# Patient Record
Sex: FEMALE | Race: WHITE | Employment: PART TIME
[De-identification: names, ages, dates, MRNs, and addresses within clinical notes are randomized per-mention and may not be internally consistent; named-entity substitution may affect disease eponyms.]

---
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
## 2019-01-08 ENCOUNTER — RECORDS - HEALTHEAST (OUTPATIENT)
Dept: ADMINISTRATIVE | Facility: OTHER | Age: 19
End: 2019-01-08

## 2019-04-08 ENCOUNTER — OFFICE VISIT (OUTPATIENT)
Dept: FAMILY MEDICINE | Facility: CLINIC | Age: 19
End: 2019-04-08
Payer: COMMERCIAL

## 2019-04-08 VITALS
DIASTOLIC BLOOD PRESSURE: 74 MMHG | OXYGEN SATURATION: 100 % | RESPIRATION RATE: 16 BRPM | SYSTOLIC BLOOD PRESSURE: 135 MMHG | WEIGHT: 141 LBS | HEART RATE: 83 BPM | TEMPERATURE: 98 F | HEIGHT: 65 IN | BODY MASS INDEX: 23.49 KG/M2

## 2019-04-08 DIAGNOSIS — F60.3 BORDERLINE PERSONALITY DISORDER (H): ICD-10-CM

## 2019-04-08 DIAGNOSIS — Z32.00 ENCOUNTER FOR CONFIRMATION OF PREGNANCY TEST RESULT WITH PHYSICAL EXAMINATION: Primary | ICD-10-CM

## 2019-04-08 DIAGNOSIS — E23.0 HYPOTHALAMIC HYPOGONADISM (H): ICD-10-CM

## 2019-04-08 DIAGNOSIS — N91.2 AMENORRHEA: ICD-10-CM

## 2019-04-08 LAB
B-HCG SERPL-ACNC: 40 IU/L (ref 0–5)
HCG UR QL: NEGATIVE

## 2019-04-08 PROCEDURE — 99203 OFFICE O/P NEW LOW 30 MIN: CPT | Performed by: FAMILY MEDICINE

## 2019-04-08 PROCEDURE — 84702 CHORIONIC GONADOTROPIN TEST: CPT | Performed by: FAMILY MEDICINE

## 2019-04-08 PROCEDURE — 81025 URINE PREGNANCY TEST: CPT | Performed by: FAMILY MEDICINE

## 2019-04-08 PROCEDURE — 36415 COLL VENOUS BLD VENIPUNCTURE: CPT | Performed by: FAMILY MEDICINE

## 2019-04-08 ASSESSMENT — MIFFLIN-ST. JEOR: SCORE: 1406.48

## 2019-04-08 NOTE — RESULT ENCOUNTER NOTE
Dear Yina,   Here are your recent results.  Good news!  Your serum HCG is elevated which is an indicative of a pregnancy. I would recommend a pelvic ultrasound after 2 weeks for dating.   I ordered an ultrasound in your chart, please call and schedule an appointment.     Call to schedule your imaging:  [facility] [phone]  Counts include 234 beds at the [facility] [phone]    Please schedule an appointment with your OB for prenatal care.     Best regards,  Immanuel Javier MD

## 2019-04-08 NOTE — PROGRESS NOTES
"  SUBJECTIVE:                                                    Yina Lock is a 19 year old female who presents to clinic today for the following health issues:      Patient states she is on birth control but took 3 test and they all came back positive.     20 yo  who presents to clinic for completion of pregnancy.  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  Last menstrual cycle was 3 years ago.  She is taking combined oral contraceptive and has never missed a dose.  Currently sexually active with one male partner.    Denies any pelvic pain.  Denies urinary urgency, frequency or dysuria.    Multiple medical conditions borderline personality disorder, generalized anxiety disorder and panic disorder.  The patient is taking multiple medications. Methotrexate for idiopathetic polyarticular arthritis. Seroquel, Abilify, trazodone, and lorazepam.     Problem list and histories reviewed & adjusted, as indicated.  Additional history: as documented    Patient Active Problem List   Diagnosis     Borderline personality disorder (H)     Hypothalamic hypogonadism (H)     Past Surgical History:   Procedure Laterality Date     COLONOSCOPY       HAND SURGERY Left      ORTHOPEDIC SURGERY      right wrist fracture with OR x 3     ORTHOPEDIC SURGERY      ganglion cyst.        Social History     Tobacco Use     Smoking status: Never Smoker     Smokeless tobacco: Never Used   Substance Use Topics     Alcohol use: Not on file     No family history on file.        ROS:  Constitutional, HEENT, cardiovascular, pulmonary, gi and gu systems are negative, except as otherwise noted.    OBJECTIVE:     /74   Pulse 83   Temp 98  F (36.7  C) (Oral)   Resp 16   Ht 1.645 m (5' 4.75\")   Wt 64 kg (141 lb)   SpO2 100%   BMI 23.65 kg/m    Body mass index is 23.65 kg/m .  GENERAL: healthy, alert and no distress  RESP: lungs clear to auscultation - no rales, rhonchi or wheezes  CV: " regular rate and rhythm, normal S1 S2, no S3 or S4, no murmur, click or rub, no peripheral edema and peripheral pulses strong    Diagnostic Test Results:  Results for orders placed or performed in visit on 04/08/19 (from the past 24 hour(s))   HCG Qual, Urine - [facility],  [place], [place]  ([id number])   Result Value Ref [facility]    HCG Qual Urine Negative NEG^Negative       ASSESSMENT/PLAN:       ICD-10-CM    1. Encounter for confirmation of pregnancy test result with physical examination Z32.00 HCG Qual, Urine - [facility],  [facility], [place]  ([id number])   2. Amenorrhea N91.2 HCG Quantitative Pregnancy, Blood ([id number])   3. Hypothalamic hypogonadism (H) E23.0    4. Borderline personality disorder (H) F60.3      Initial urine pregnancy test was negative.  Serum hCG was ordered today.  Patient is taking multiple medications with chiropractic clinic.  She was advised to hold the following medications until we get the confirmatory hCG.  The advised to hold Seroquel, methotrexate and Abilify.     Patient will need to be seen by MFM and OB to establish care if this is indeed a pregnancy. Will order first trimester ultrasound for dating.     Immanuel Javier MD  [facility]

## 2019-04-09 ASSESSMENT — ASTHMA QUESTIONNAIRES: ACT_TOTALSCORE: 25

## 2019-05-14 ENCOUNTER — HOSPITAL PATHOLOGY (OUTPATIENT)
Dept: OTHER | Facility: CLINIC | Age: 19
End: 2019-05-14

## 2019-05-15 LAB — COPATH REPORT: NORMAL

## 2019-05-17 ENCOUNTER — HOSPITAL ENCOUNTER (EMERGENCY)
Facility: CLINIC | Age: 19
Discharge: HOME | End: 2019-05-18
Attending: EMERGENCY MEDICINE | Admitting: EMERGENCY MEDICINE
Payer: COMMERCIAL

## 2019-05-17 DIAGNOSIS — N93.9 VAGINAL BLEEDING: ICD-10-CM

## 2019-05-17 LAB
ABO + RH BLD: NORMAL
ABO + RH BLD: NORMAL
BASOPHILS # BLD AUTO: 0 10E9/L (ref 0–0.2)
BASOPHILS NFR BLD AUTO: 0.3 %
BLD GP AB SCN SERPL QL: NORMAL
BLOOD BANK CMNT PATIENT-IMP: NORMAL
DIFFERENTIAL METHOD BLD: NORMAL
EOSINOPHIL # BLD AUTO: 0.1 10E9/L (ref 0–0.7)
EOSINOPHIL NFR BLD AUTO: 0.8 %
ERYTHROCYTE [DISTWIDTH] IN BLOOD BY AUTOMATED COUNT: 13.2 % (ref 10–15)
HCT VFR BLD AUTO: 35.4 % (ref 35–47)
HGB BLD-MCNC: 11.9 G/DL (ref 11.7–15.7)
IMM GRANULOCYTES # BLD: 0 10E9/L (ref 0–0.4)
IMM GRANULOCYTES NFR BLD: 0.3 %
LYMPHOCYTES # BLD AUTO: 2.5 10E9/L (ref 0.8–5.3)
LYMPHOCYTES NFR BLD AUTO: 41.4 %
MCH RBC QN AUTO: 30.4 PG (ref 26.5–33)
MCHC RBC AUTO-ENTMCNC: 33.6 G/DL (ref 31.5–36.5)
MCV RBC AUTO: 90 FL (ref 78–100)
MONOCYTES # BLD AUTO: 0.7 10E9/L (ref 0–1.3)
MONOCYTES NFR BLD AUTO: 11.1 %
NEUTROPHILS # BLD AUTO: 2.8 10E9/L (ref 1.6–8.3)
NEUTROPHILS NFR BLD AUTO: 46.1 %
NRBC # BLD AUTO: 0 10*3/UL
NRBC BLD AUTO-RTO: 0 /100
PLATELET # BLD AUTO: 256 10E9/L (ref 150–450)
RBC # BLD AUTO: 3.92 10E12/L (ref 3.8–5.2)
SPECIMEN EXP DATE BLD: NORMAL
WBC # BLD AUTO: 6.1 10E9/L (ref 4–11)

## 2019-05-17 PROCEDURE — 96361 HYDRATE IV INFUSION ADD-ON: CPT

## 2019-05-17 PROCEDURE — 25000128 H RX IP 250 OP 636: Performed by: EMERGENCY MEDICINE

## 2019-05-17 PROCEDURE — 99285 EMERGENCY DEPT VISIT HI MDM: CPT | Mod: 25

## 2019-05-17 PROCEDURE — 96374 THER/PROPH/DIAG INJ IV PUSH: CPT

## 2019-05-17 PROCEDURE — 86901 BLOOD TYPING SEROLOGIC RH(D): CPT | Performed by: EMERGENCY MEDICINE

## 2019-05-17 PROCEDURE — 86900 BLOOD TYPING SEROLOGIC ABO: CPT | Performed by: EMERGENCY MEDICINE

## 2019-05-17 PROCEDURE — 86850 RBC ANTIBODY SCREEN: CPT | Performed by: EMERGENCY MEDICINE

## 2019-05-17 PROCEDURE — 85025 COMPLETE CBC W/AUTO DIFF WBC: CPT | Performed by: EMERGENCY MEDICINE

## 2019-05-17 RX ORDER — LORAZEPAM 2 MG/ML
1 INJECTION INTRAMUSCULAR ONCE
Status: COMPLETED | OUTPATIENT
Start: 2019-05-17 | End: 2019-05-17

## 2019-05-17 RX ORDER — SODIUM CHLORIDE 9 MG/ML
1000 INJECTION, SOLUTION INTRAVENOUS CONTINUOUS
Status: DISCONTINUED | OUTPATIENT
Start: 2019-05-17 | End: 2019-05-18 | Stop reason: HOSPADM

## 2019-05-17 RX ADMIN — LORAZEPAM 1 MG: 2 INJECTION INTRAMUSCULAR; INTRAVENOUS at 23:06

## 2019-05-17 RX ADMIN — SODIUM CHLORIDE 1000 ML: 9 INJECTION, SOLUTION INTRAVENOUS at 23:06

## 2019-05-17 ASSESSMENT — ENCOUNTER SYMPTOMS
ABDOMINAL PAIN: 0
HEMATURIA: 0
DYSURIA: 0
NERVOUS/ANXIOUS: 1
NAUSEA: 1
BACK PAIN: 0
FEVER: 0
FREQUENCY: 0
VOMITING: 1
DIZZINESS: 1
SHORTNESS OF BREATH: 0

## 2019-05-17 NOTE — ED AVS SNAPSHOT
[facility] Emergency Department  [address]  [facility] [address]  Phone:  [phone]  Fax:  [phone]                                    Yina Lock   MRN: [id number]    Department:  [facility] Emergency Department   Date of Visit:  5/17/2019           After Visit Summary Signature Page    I have received my discharge instructions, and my questions have been answered. I have discussed any challenges I see with this plan with the nurse or doctor.    ..........................................................................................................................................  Patient/Patient Representative Signature      ..........................................................................................................................................  Patient Representative Print Name and Relationship to Patient    ..................................................               ................................................  Date                                   Time    ..........................................................................................................................................  Reviewed by Signature/Title    ...................................................              ..............................................  Date                                               Time          22EPIC Rev 08/18

## 2019-05-18 ENCOUNTER — APPOINTMENT (OUTPATIENT)
Dept: ULTRASOUND IMAGING | Facility: CLINIC | Age: 19
End: 2019-05-18
Attending: EMERGENCY MEDICINE
Payer: COMMERCIAL

## 2019-05-18 VITALS
WEIGHT: 140 LBS | OXYGEN SATURATION: 100 % | HEART RATE: 80 BPM | BODY MASS INDEX: 23.48 KG/M2 | RESPIRATION RATE: 16 BRPM | DIASTOLIC BLOOD PRESSURE: 65 MMHG | TEMPERATURE: 98.2 F | SYSTOLIC BLOOD PRESSURE: 110 MMHG

## 2019-05-18 PROCEDURE — 76830 TRANSVAGINAL US NON-OB: CPT

## 2019-05-18 NOTE — ED PROVIDER NOTES
History     Chief Complaint:  Vaginal Bleeding    HPI   Yina Lock is a 19 year old female who presents to the emergency department for evaluation of vaginal bleeding. Of note, the patient had a D&C performed on 5/14 for blighted ovum. She states that, today, she had onset of heavy vaginal bleeding at around 1800, including 3 baseball-sized clots since. She indicates she has been going through around 1 pad every 30 minutes, although the bleeding has improved in the past 30 minutes. The patient further reports dizziness, nausea, and vomit accompanying her bleeding, as well as some anxiety. The patient denies any shortness of breath, chest pain, syncope, fever, abdominal pain, back pain, or urinary symptoms.    Allergies:  Lamotrigine  Latex  Sulfa Drugs  Adhesive Tape  Cats  Clindamycin  Dogs  Pollen Extract  Shellfish Allergy  Cephalosporins  Pistachios [Nuts]  Shellfish-Derived Products     Medications:    Albuterol  Abilify  Zithromax  Folvite  Imodium  Ativan  Melatonin  Methotrexate  Mirtazapine   Relafen  Deltasone  Seroquel    Past Medical History:    Amenorrhea  Polyarticular juvenile idiopathic arthritis  Borderline personality     Past Surgical History:    Colonoscopy  Left hand surgery  Right wrist fracture with OR x3  Ganglion cyst orthopedic surgery  D&C    Family History:    No past pertinent family history.    Social History:  Presents with significant other's mother.  Never smoker.  Negative for alcohol use.  Negative for drug use.  Marital Status:  Single [1]     Review of Systems   Constitutional: Negative for fever.   Respiratory: Negative for shortness of breath.    Cardiovascular: Negative for chest pain.   Gastrointestinal: Positive for nausea and vomiting. Negative for abdominal pain.   Genitourinary: Positive for vaginal bleeding. Negative for decreased urine volume, dysuria, frequency, hematuria and urgency.   Musculoskeletal: Negative for back pain.   Neurological: Positive for  dizziness.   Psychiatric/Behavioral: The patient is nervous/anxious.    All other systems reviewed and are negative.        Physical Exam     Patient Vitals for the past 24 hrs:   BP Temp Temp src Pulse Heart Rate Resp SpO2 Weight   05/18/19 0138 110/65 -- -- 80 -- -- -- --   05/18/19 0015 123/69 -- -- -- -- -- 100 % --   05/17/19 2308 126/77 -- -- -- 93 16 100 % --   05/17/19 2147 (!) 119/96 98.2  F (36.8  C) Temporal 97 -- 16 95 % 63.5 kg (140 lb)     Physical Exam  Constitutional: Well developed, nontox appearance  Head: Atraumatic.   Mouth/Throat: Oropharynx is clear and moist.   Neck:  no stridor  Eyes: no scleral icterus  Cardiovascular: RRR, 2+ bilat radial pulses  Pulmonary/Chest: nml resp effort, Clear BS bilat  Abdominal: ND, +BS, soft, NT, no rebound or guarding   : no CVA tenderness bilat   Pelvic: Speculum exam demonstrated dark red blood in vaginal vault with a few associated clots, slow oozing from cervix, no vaginal lacerations or foreign bodies, bimanual exam deferred  Ext: Warm, well perfused, no edema  Neurological: A&O, symmetric facies, moves ext x4  Skin: Skin is warm and dry.   Psychiatric: Anxious. Behavior is normal. Thought content normal.   Nursing note and vitals reviewed.        Emergency Department Course     Imaging:  Radiographic findings were communicated with the patient who voiced understanding of the findings.    US Pelvic Complete w Transvaginal:  Thickened and heterogeneous endometrium measuring up to  1.7 cm and consistent with retained products of conception. As per radiology.    Laboratory:  CBC: WBC: 6.1, HGB: 11.9, PLT: 256    ABO Blood Type and Screen:  ABO   Date Value Ref Range Status   05/17/2019 A  Final     RH(D)   Date Value Ref Range Status   05/17/2019 Pos  Final     Antibody Screen   Date Value Ref Range Status   05/17/2019 Neg  Final     Interventions:  2306 Ativan 1 mg IV   NS 1L IV BOLUS    Emergency Department Course:  Nursing notes and vitals reviewed. 2210  I performed an exam of the patient as documented above.     IV inserted. Medicine administered as documented above. Blood drawn. This was sent to the lab for further testing, results above.    The patient was sent for a US Pelvic while in the emergency department, findings above.     0140 I rechecked the patient and discussed the results of her workup thus far.     Findings and plan explained to the Patient. Patient discharged home with instructions regarding supportive care, medications, and reasons to return. The importance of close follow-up was reviewed.     I personally reviewed the laboratory results with the Patient and answered all related questions prior to discharge.    Impression & Plan      Medical Decision Makin-year-old female presenting with vaginal bleeding status post D&C    Differential diagnosis includes postprocedural vaginal bleeding NOS, anemia, retained products of conception.  Ectopic pregnancy and pregnancy seem unlikely given the patient's chart review, previous lab testing and recent D&C.  Patient's vital signs within normal limits and she is given fluids as noted above with improvement in her symptoms.  Labs significant for normal hemoglobin level.  Ultrasound demonstrated likely normal postprocedural D&C findings.  The patient rated blood loss is not seeing significant upon my exam and she reports that it is slowing down in the emergency department.  This is likely normal postprocedural bleeding with intermittent worsening and clots.  At this time I feel the patient is safe for discharge.  Recommendations given regarding follow up with OBGYN and return to the emergency department as needed for new or worsening symptoms.  Counseled on all results, diagnosis and disposition.  Pt understanding and agreeable to plan. Patient discharged in stable condition.        Diagnosis:    ICD-10-CM   1. Vaginal bleeding N93.9       Disposition:  discharged to home    Jim CINTRON, am serving as  a scribe on 5/17/2019 at 10:02 PM to personally document services performed by Shiraz Gann MD based on my observations and the provider's statements to me.     Jim Pena  5/17/2019   [facility] EMERGENCY DEPARTMENT       Shiraz Gann MD  05/18/19 1908

## 2019-05-18 NOTE — ED TRIAGE NOTES
Pt had a d &C on Tuesday and today she started to bleed heavily at 7pm.  Pt feeling dizzy and light headed also.

## 2019-05-18 NOTE — DISCHARGE INSTRUCTIONS
1. -Take acetaminophen 500 to 1000 mg by mouth every 4 to 6 hours as needed for pain or fever.  Do not take more than 4000 mg in 24 hours.  Do not take within 6 hours of another acetaminophen containing medication such as norco (vicodin) or percocet.  - Take ibuprofen 600 to 800 mg by mouth every 6 to 8 hours as needed for pain or fever  2.  Drink plenty of fluids at home.  3.  Please return to the emergency department as needed for new or worsening symptoms including fainting, worsening shortness of breath or chest pain, persistent bright red vaginal bleeding, any other concerning symptoms.

## 2019-06-12 ENCOUNTER — TELEPHONE (OUTPATIENT)
Dept: BEHAVIORAL HEALTH | Facility: CLINIC | Age: 19
End: 2019-06-12

## 2019-06-12 ENCOUNTER — HOSPITAL ENCOUNTER (EMERGENCY)
Facility: CLINIC | Age: 19
Discharge: PSYCHIATRIC OR SUBSTANCE USE FACILITY | End: 2019-06-12
Attending: EMERGENCY MEDICINE | Admitting: EMERGENCY MEDICINE
Payer: COMMERCIAL

## 2019-06-12 VITALS
WEIGHT: 138.67 LBS | DIASTOLIC BLOOD PRESSURE: 74 MMHG | HEART RATE: 78 BPM | OXYGEN SATURATION: 100 % | SYSTOLIC BLOOD PRESSURE: 125 MMHG | RESPIRATION RATE: 18 BRPM | TEMPERATURE: 97.8 F | BODY MASS INDEX: 23.25 KG/M2

## 2019-06-12 DIAGNOSIS — F31.10 BIPOLAR I DISORDER WITH MANIA (H): ICD-10-CM

## 2019-06-12 LAB
AMPHETAMINES UR QL SCN: NEGATIVE
BARBITURATES UR QL: NEGATIVE
BENZODIAZ UR QL: NEGATIVE
CANNABINOIDS UR QL SCN: NEGATIVE
COCAINE UR QL: NEGATIVE
OPIATES UR QL SCN: NEGATIVE
PCP UR QL SCN: NEGATIVE

## 2019-06-12 PROCEDURE — 99285 EMERGENCY DEPT VISIT HI MDM: CPT | Mod: 25

## 2019-06-12 PROCEDURE — 80307 DRUG TEST PRSMV CHEM ANLYZR: CPT | Performed by: EMERGENCY MEDICINE

## 2019-06-12 PROCEDURE — 90791 PSYCH DIAGNOSTIC EVALUATION: CPT

## 2019-06-12 PROCEDURE — 25000132 ZZH RX MED GY IP 250 OP 250 PS 637: Performed by: EMERGENCY MEDICINE

## 2019-06-12 RX ORDER — LORAZEPAM 1 MG/1
1 TABLET ORAL ONCE
Status: COMPLETED | OUTPATIENT
Start: 2019-06-12 | End: 2019-06-12

## 2019-06-12 RX ORDER — LORAZEPAM 1 MG/1
1 TABLET ORAL
Status: DISCONTINUED | OUTPATIENT
Start: 2019-06-12 | End: 2019-06-12 | Stop reason: HOSPADM

## 2019-06-12 RX ORDER — QUETIAPINE FUMARATE 50 MG/1
50 TABLET, FILM COATED ORAL ONCE
Status: COMPLETED | OUTPATIENT
Start: 2019-06-12 | End: 2019-06-12

## 2019-06-12 RX ADMIN — QUETIAPINE 50 MG: 50 TABLET ORAL at 04:36

## 2019-06-12 RX ADMIN — LORAZEPAM 1 MG: 1 TABLET ORAL at 04:18

## 2019-06-12 ASSESSMENT — ENCOUNTER SYMPTOMS
SLEEP DISTURBANCE: 1
AGITATION: 1
APPETITE CHANGE: 1
COLOR CHANGE: 1
HYPERACTIVE: 1
NERVOUS/ANXIOUS: 1

## 2019-06-12 NOTE — TELEPHONE ENCOUNTER
R: author called [facility] at 8:07 am and left a vm stating author would fax the dec eval to them. Author then faxed the dec eval to them and requested they call intake back after reviewing the case. joya Preciado called [facility] ED staff at 8:45 am and informed them that [facility] will review the case and that when author learns the outcome, they will be notified with the info. [facility] called saying they accepted pt. Author notified [facility] ED at 10:51 am. joya

## 2019-06-12 NOTE — ED NOTES
Pt has been calm, cooperative, pleasant, and talkative since taking over care. Per [facility], [facility] reviewing pts chart. Provided with water and orange juice throughout the morning, using her phone, no other requests or complaints. Will order pt breakfast tray.
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
## 2019-06-12 NOTE — ED PROVIDER NOTES
[facility] ED Behavioral Health Handoff Note:       Brief HPI:  This is a 19 year old female signed out to me by Dr. Lewis .  See initial ED Provider note for details of the presentation.   Patient requesting mental health resources, awaiting formal [facility] chart review.  There is concern for kinsey.      The patient is not on a hold.      The patient has been given her previous meds, seroquel.     Exam:   Temp:  [97.8  F (36.6  C)] 97.8  F (36.6  C)  Pulse:  [76-79] 78  Resp:  [18] 18  BP: (125-144)/(73-74) 125/74  SpO2:  [100 %] 100 %    General: Resting comfortably   Head:  The scalp, face, and head appear normal  Eyes:  The pupils are normal    Conjunctivae and sclera appear normal  ENT:    The nose is normal  Neck:  Normal range of motion  MSK:  Moves all extremities equally  Skin:  No rash or lesions noted.  Neuro: Speech is mildly pressured  Psych:  Awake. Alert.  Blunt affect.  Denies suicidal ideation or homicidal ideation.              ED Course:    There were no significant events while under my care.  Patient was accepted by [facility] facility for mental health inpatient evaluation.          Impression:    ICD-10-CM    1. Bipolar I disorder with kinsey (H) F31.10        Plan:    Transferred to [facility]      RESULTS:   Results for orders placed or performed during the hospital encounter of 06/12/19 (from the past 24 hour(s))   Drug abuse screen 77 urine (FL, RH, SH)     Status: None    Collection Time: 06/12/19  5:15 AM   Result Value Ref Range    Amphetamine Qual Urine Negative NEG^Negative    Barbiturates Qual Urine Negative NEG^Negative    Benzodiazepine Qual Urine Negative NEG^Negative    Cannabinoids Qual Urine Negative NEG^Negative    Cocaine Qual Urine Negative NEG^Negative    Opiates Qualitative Urine Negative NEG^Negative    PCP Qual Urine Negative NEG^Negative           Lilli Gaona  6/12/2019   [facility] EMERGENCY DEPARTMENT       Lilli Gaona DO  06/12/19 5548

## 2019-06-12 NOTE — ED TRIAGE NOTES
Pt w/phych hx (Bipolar 1) comes in with worsening stressors (recent miscarriage) and seeking help.  Denies self harm but feels like she may be losing control.  Hx of punching walls to relieve stress.

## 2020-03-11 ENCOUNTER — HEALTH MAINTENANCE LETTER (OUTPATIENT)
Age: 20
End: 2020-03-11

## 2021-01-03 ENCOUNTER — HEALTH MAINTENANCE LETTER (OUTPATIENT)
Age: 21
End: 2021-01-03

## 2021-04-25 ENCOUNTER — HEALTH MAINTENANCE LETTER (OUTPATIENT)
Age: 21
End: 2021-04-25

## 2021-10-10 ENCOUNTER — HEALTH MAINTENANCE LETTER (OUTPATIENT)
Age: 21
End: 2021-10-10

## 2022-05-21 ENCOUNTER — HEALTH MAINTENANCE LETTER (OUTPATIENT)
Age: 22
End: 2022-05-21

## 2022-09-18 ENCOUNTER — HEALTH MAINTENANCE LETTER (OUTPATIENT)
Age: 22
End: 2022-09-18

## 2023-06-04 ENCOUNTER — HEALTH MAINTENANCE LETTER (OUTPATIENT)
Age: 23
End: 2023-06-04